# Patient Record
Sex: MALE | Race: ASIAN
[De-identification: names, ages, dates, MRNs, and addresses within clinical notes are randomized per-mention and may not be internally consistent; named-entity substitution may affect disease eponyms.]

---

## 2019-07-02 ENCOUNTER — HOSPITAL ENCOUNTER (OUTPATIENT)
Dept: HOSPITAL 74 - JASU-SURG | Age: 64
Discharge: HOME | End: 2019-07-02
Attending: UROLOGY
Payer: COMMERCIAL

## 2019-07-02 VITALS — HEART RATE: 92 BPM

## 2019-07-02 VITALS — BODY MASS INDEX: 28 KG/M2

## 2019-07-02 DIAGNOSIS — E11.9: ICD-10-CM

## 2019-07-02 DIAGNOSIS — N40.1: ICD-10-CM

## 2019-07-02 DIAGNOSIS — N30.00: ICD-10-CM

## 2019-07-02 DIAGNOSIS — Z79.84: ICD-10-CM

## 2019-07-02 DIAGNOSIS — R35.0: ICD-10-CM

## 2019-07-02 DIAGNOSIS — N21.0: Primary | ICD-10-CM

## 2019-07-02 DIAGNOSIS — I10: ICD-10-CM

## 2019-07-02 PROCEDURE — 0VT08ZZ RESECTION OF PROSTATE, VIA NATURAL OR ARTIFICIAL OPENING ENDOSCOPIC: ICD-10-PCS | Performed by: UROLOGY

## 2019-07-02 PROCEDURE — 0TBB8ZX EXCISION OF BLADDER, VIA NATURAL OR ARTIFICIAL OPENING ENDOSCOPIC, DIAGNOSTIC: ICD-10-PCS | Performed by: UROLOGY

## 2019-07-02 PROCEDURE — 0TCB8ZZ EXTIRPATION OF MATTER FROM BLADDER, VIA NATURAL OR ARTIFICIAL OPENING ENDOSCOPIC: ICD-10-PCS | Performed by: UROLOGY

## 2019-07-02 NOTE — OP
DATE OF OPERATION:  07/02/2019

 

PREOPERATIVE DIAGNOSIS:  Bladder stone, benign prostatic hypertrophy.

 

POSTOPERATIVE DIAGNOSIS:  Bladder stone, benign prostatic hypertrophy.

 

PROCEDURE:  Cystoscopy, bipolar laser of prostate, lithotripsy of bladder stone,

bladder biopsy.

 

BRIEF HISTORY:  Patient is a very pleasant, 64-year-old gentleman with a long history

of BPH, status post prior BPH procedure as an outpatient, most notably was a UroLift

procedure.  Patient developed a bladder stone and had some frequency postoperatively;

however, he was unobstructed and emptied his bladder basically to completion. 

Cystoscopy noted an area of redness in the posterior left side of the bladder wall. 

All risks and benefits were discussed with the patient clearly.  Informed consent was

obtained. 

 

BRIEF OPERATIVE NOTE:  Patient was brought to the operating room and placed in the

supine position.  General anesthesia was administered.  Patient was transferred to

the dorsal lithotomy position, prepped and draped in the usual sterile fashion. 

Intravenous antibiotics were given.  

 

At this time, a 25-Mauritanian resectoscope sheath was placed into the bladder under

direct vision.  There were no urethral strictures.  The prostate was sub-occlusive. 

There were 2 clips notable bilaterally, just proximal to the bladder neck.  The left

clip had a bladder stone, approximately 1 cm, attached to it.  There was a second

bladder stone within the urinary bladder.  This was out of the way of the clips and

the bladder neck.  

 

At this time, using a Holmium laser, the stones were fragmented.  The stone attached

to the clip was also fragmented and the Prolene stitch was lasered free.  The clip

was then removed without difficulty.  At this time, a bladder biopsy was performed of

the edematous area and given off.  It appeared to be mostly inflammatory in nature

and was approximately a 2 cm area of the posterior bladder wall.  

 

At this time, using the bipolar from the 10 to 6 and then 2 to 6 o'clock positions,

the obstructing prostate tissue was fulgurated.  The tissue approximately 1 cm

proximal to the verumontanum was left intact.  There was no evidence of active

bleeding.  There were no bladder fragments or bladder stones visible within the

bladder at the termination of the procedure.  All instruments were removed.  The

20-Mauritanian Harris catheter was placed to straight drainage.  Patient was brought to the

recovery room in stable and satisfactory condition.

 

 

SILVER AGOSTO M.D.

 

[id number]

DD: 07/02/2019 13:09

DT: 07/02/2019 22:02

Job #:  [id number]

## 2019-07-05 NOTE — PATH
Surgical Pathology Report



Patient Name:  CUONG HOYOS

Accession #:  [id number]

[facility]. Rec. #:  [id number]                                                        

   /Age/Gender:  1955 (Age: 64) / M

Account:  [id number]                                                          

             Location: [facility]

Taken:  2019

Received:  7/3/2019

Reported:  2019

Physicians:  Jean Celaya M.D.

  



Specimen(s) Received

 BLADDER BIOPSY ANTERIOR WALL 





Clinical History

BPH, cystitis, bladder stone







Final Diagnosis

BLADDER, ANTERIOR WALL, BIOPSY:

SMALL FRAGMENT OF BLADDER MUCOSA WITH MILD CHRONIC (INTERSTITIAL) AND FOCAL

ACUTE CYSTITIS.





***Electronically Signed***

Soo Lamb M.D.





Gross Description

Received in formalin, labeled "bladder biopsy anterior wall" is a tan, irregular

portion of soft tissue measuring less than 0.1 cm. in greatest dimension. The

specimen is submitted in toto in one cassette.

/7/3/2019



saudi/7/3/2019

## 2019-07-09 VITALS — TEMPERATURE: 97.8 F | SYSTOLIC BLOOD PRESSURE: 138 MMHG | DIASTOLIC BLOOD PRESSURE: 88 MMHG

## 2022-12-02 PROBLEM — Z00.00 ENCOUNTER FOR PREVENTIVE HEALTH EXAMINATION: Status: ACTIVE | Noted: 2022-12-02

## 2022-12-05 ENCOUNTER — APPOINTMENT (OUTPATIENT)
Dept: PULMONOLOGY | Facility: CLINIC | Age: 67
End: 2022-12-05

## 2022-12-05 VITALS
WEIGHT: 176 LBS | TEMPERATURE: 97 F | DIASTOLIC BLOOD PRESSURE: 80 MMHG | HEIGHT: 66 IN | SYSTOLIC BLOOD PRESSURE: 120 MMHG | BODY MASS INDEX: 28.28 KG/M2

## 2022-12-05 DIAGNOSIS — R91.8 OTHER NONSPECIFIC ABNORMAL FINDING OF LUNG FIELD: ICD-10-CM

## 2022-12-05 PROCEDURE — 99204 OFFICE O/P NEW MOD 45 MIN: CPT

## 2022-12-05 NOTE — HISTORY OF PRESENT ILLNESS
[FreeTextEntry1] : Evaluate pulmonary nodules [de-identified] : This is a 67-year-old male nonsmoker with a history of left nephrectomy March 2020 for renal cell cancer. He has been followed with yearly CAT scans of the chest. A CAT scan done November 2 revealed 3 two mm pulmonary nodules scattered throughout both lung fields some appear new compared with one year ago. Again the largest nodule is 2 mm and they're about 3 or 4 of them. The patient is asymptomatic without chest pain, shortness of breath, wheezing. He feels well. Appetite is good there is no weight loss. Past medical history significant for hypertension, hyperlipidemia, diabetes. Medications were reviewed. There is no prior history of lung pathology.

## 2022-12-05 NOTE — ASSESSMENT
[FreeTextEntry1] : 2 mm pulmonary nodules are almost certainly benign of no significance. However in view of the history of renal cell cancer we will repeat the scan in 6 months. I again highly doubt this represents metastatic disease I have reassured the patient.